# Patient Record
(demographics unavailable — no encounter records)

---

## 2025-01-10 NOTE — ASSESSMENT
[FreeTextEntry1] : PARVEEN on CKD 3 2/2 dehydration, counseled on increasing po fluids, if an ongoing issue will reduce lisinopril/hctz CKD 3  - long standing, suspect 2/2 longstanding HTN and DM, both well controlled  - did not take bp meds this am - cont jaridance  HTN  - white coat at office, normal range at home, confirmed by checking her machine - did nottake BP meds this am  LE edema  - 2/2 ccb, no sig proteinuria - resolved with cessation of canned foods  counseled on low salt diet Counseled on avoidance of NSAIDs, management of risk factors for disease progression.  HCM - rec bone scan, f/u with PCP - norm D2 level  hyperlipidemia  -cont statin, check lfts and lipids  PH - f/u with pulm  Consider resumption of low dose acei ( was on 10), consider addition of 5 for heart/kidneys Pulm , Cards, Endo notes reviewed Sleep study reviewed, mild lisset renal US mage reviewed - no hydro.

## 2025-01-10 NOTE — HISTORY OF PRESENT ILLNESS
[FreeTextEntry1] : Pleasant sp speaking female with longstanding hx of DM (x ~9 yrs), HTN x ? ~ 20 yrs - always well controlled - referred for eval of CKD 3/4 - followed by cardiology, Dr. 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 Hab: no eto or tobacco ;  1/2025 f/u CKD, HTN, PH  didnt take her BP meds this am, usu takes with food, overslept   and didnt have time to eat  4/2024 parveen on ckd - cr  improved from 2 to 1.4-1.6 --> 1.3 le edema resolved with cessation of canned foods mild sleep apnea ( pulm note reviewed) BP readings at home goal Occ high reading after dining out with daughter - was high at office - suspect this reflects white coat HTN  11/2023 parveen on ckd - cr  improved from 2 tp 1.4-1.6 le edema resolved with cessation of canned foods mild sleep apnea ( pulm note reviewed) BP readings at home goal  8/2023 did sleep study, mild NELLY, positional therapy parveen on CKD, cr 2--> 1.4, better than baseline of 1.6 le edema, no sig proteinuria, so likely 2/2 ccb BP readings in office always elevated, readings at home wnl - checked machine here in office, readings matched  feels well  4/2023 here for f/u s/p episode of PARVEEN - was sent to ED for cr of 2 ( baseline 1.6) appeared to be dehydrated rechecked was 1.8BP readings slightly elevated here, but home readings all 120's - ER/hos records reveiwed, BMET, CBC, UPC reveiwed ( 0.25)

## 2025-01-10 NOTE — PHYSICAL EXAM
[General Appearance - Alert] : alert [General Appearance - In No Acute Distress] : in no acute distress [Sclera] : the sclera and conjunctiva were normal [Extraocular Movements] : extraocular movements were intact [Outer Ear] : the ears and nose were normal in appearance [Hearing Threshold Finger Rub Not [place]] : hearing was normal [Neck Appearance] : the appearance of the neck was normal [Exaggerated Use Of Accessory Muscles For Inspiration] : no accessory muscle use [Apical Impulse] : the apical impulse was normal [Heart Sounds] : normal S1 and S2 [Edema] : there was no peripheral edema [Veins - Varicosity Changes] : there were no varicosital changes [Bowel Sounds] : normal bowel sounds [Abdomen Soft] : soft [Abnormal Walk] : normal gait [Musculoskeletal - Swelling] : no joint swelling seen [Skin Color & Pigmentation] : normal skin color and pigmentation [] : no rash [Cranial Nerves] : cranial nerves 2-12 were intact [No Focal Deficits] : no focal deficits [Oriented To Time, Place, And Person] : oriented to person, place, and time [Impaired Insight] : insight and judgment were intact

## 2025-02-19 NOTE — HISTORY OF PRESENT ILLNESS
[TextBox_4] : 89 year old female with moderate PH and mild NELLY came for f/u Last seen last year Echo with the same PA pressures New labs: CO3 normal at 25 H/H normal at 39.3 BNP mild increase 302  Feels the same BP is increased when she comes to [facility] office. At home BP is normal. No dyspnea No hospitalizations since the last visit Denies cough Sleeps the same No daytime sleepiness

## 2025-02-19 NOTE — PHYSICAL EXAM
[No Acute Distress] : no acute distress [Normal S1, S2] : normal s1, s2 [No Resp Distress] : no resp distress [Clear to Auscultation Bilaterally] : clear to auscultation bilaterally [Normal Gait] : normal gait [No Focal Deficits] : no focal deficits [Oriented x3] : oriented x3 [Normal Affect] : normal affect

## 2025-03-22 NOTE — HISTORY OF PRESENT ILLNESS
[FreeTextEntry1] : Ms Lynch has been followed here since 2001 for chest pain and cardiac risk factors.Her cardiac workup has thusfar been unremarkable  There have been no hospitalizations since last visit.  She did have PARVEEN and has been followed by nephrology.  At some point and her lisinopril HCT has been discontinued. She denies chest pain, dyspnea, palpitations or syncope. denies pnd, orthopnea or pedal edema   She remains active. Walks outside.

## 2025-07-03 NOTE — ASSESSMENT
[FreeTextEntry1] : PARVEEN on CKD 3 2/2 dehydration, counseled on increasing po fluids, if an ongoing issue will reduce lisinopril/hctz CKD 3  - long standing, suspect 2/2 longstanding HTN and DM, both well controlled  - did not take bp meds this am - cont jaridance  HTN  - white coat at office, normal range at home, confirmed by checking her machine  LE edema  - 2/2 ccb, no sig proteinuria - resolved with cessation of canned foods  counseled on low salt diet Counseled on avoidance of NSAIDs, management of risk factors for disease progression.  HCM - rec bone scan, f/u with PCP - norm D2 level  hyperlipidemia  -cont statin, check lfts and lipids  PH - f/u with pulm/cards  hPTH - check vit D levels  Consider resumption of low dose acei ( was on 10), consider addition of 5 for heart/kidneys Pulm , Cards, Endo notes reviewed Sleep study reviewed, mild lisset renal US mage reviewed - no hydro.

## 2025-07-03 NOTE — PHYSICAL EXAM
[General Appearance - Alert] : alert [General Appearance - In No Acute Distress] : in no acute distress [Sclera] : the sclera and conjunctiva were normal [Extraocular Movements] : extraocular movements were intact [Outer Ear] : the ears and nose were normal in appearance [Hearing Threshold Finger Rub Not [place]] : hearing was normal [Neck Appearance] : the appearance of the neck was normal [Exaggerated Use Of Accessory Muscles For Inspiration] : no accessory muscle use [Apical Impulse] : the apical impulse was normal [Heart Sounds] : normal S1 and S2 [Edema] : there was no peripheral edema [Veins - Varicosity Changes] : there were no varicosital changes [Bowel Sounds] : normal bowel sounds [Abdomen Soft] : soft [Abnormal Walk] : normal gait [Musculoskeletal - Swelling] : no joint swelling seen [Skin Color & Pigmentation] : normal skin color and pigmentation [] : no rash [Cranial Nerves] : cranial nerves 2-12 were intact [No Focal Deficits] : no focal deficits [Oriented To Time, Place, And Person] : oriented to person, place, and time [Impaired Insight] : insight and judgment were intact

## 2025-07-03 NOTE — HISTORY OF PRESENT ILLNESS
[FreeTextEntry1] : Pleasant sp speaking female with longstanding hx of DM (x ~9 yrs), HTN x ? ~ 20 yrs - always well controlled - referred for eval of CKD 3/4 - followed by cardiology, Dr. 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 Hab: no eto or tobacco ;  7/2025 - f/u ckd, PHtn, HTN, hPTH - seen by cardiology, had an ECHO, mod phtn, MR/TR (5/2025) - NELLY   1/2025 f/u CKD, HTN, PH  didnt take her BP meds this am, usu takes with food, overslept   and didnt have time to eat  4/2024 parveen on ckd - cr  improved from 2 to 1.4-1.6 --> 1.3 le edema resolved with cessation of canned foods mild sleep apnea ( pulm note reviewed) BP readings at home goal Occ high reading after dining out with daughter - was high [place] office - suspect this reflects white coat HTN  11/2023 parveen on ckd - cr  improved from 2 tp 1.4-1.6 le edema resolved with cessation of canned foods mild sleep apnea ( pulm note reviewed) BP readings at home goal  8/2023 did sleep study, mild NELLY, positional therapy parveen on CKD, cr 2--> 1.4, better than baseline of 1.6 le edema, no sig proteinuria, so likely 2/2 ccb BP readings in office always elevated, readings at home wnl - checked machine here in office, readings matched  feels well  4/2023 here for f/u s/p episode of PARVEEN - was sent to ED for cr of 2 ( baseline 1.6) appeared to be dehydrated rechecked was 1.8BP readings slightly elevated here, but home readings all 120's - ER/hos records reveiwed, BMET, CBC, UPC reveiwed ( 0.25)